# Patient Record
Sex: FEMALE | Race: BLACK OR AFRICAN AMERICAN | NOT HISPANIC OR LATINO | URBAN - METROPOLITAN AREA
[De-identification: names, ages, dates, MRNs, and addresses within clinical notes are randomized per-mention and may not be internally consistent; named-entity substitution may affect disease eponyms.]

---

## 2017-04-13 ENCOUNTER — OFFICE VISIT - HEALTHEAST (OUTPATIENT)
Dept: FAMILY MEDICINE | Facility: CLINIC | Age: 42
End: 2017-04-13

## 2017-04-13 DIAGNOSIS — G89.29 CHRONIC BILATERAL LOW BACK PAIN WITHOUT SCIATICA: ICD-10-CM

## 2017-04-13 DIAGNOSIS — M54.50 CHRONIC BILATERAL LOW BACK PAIN WITHOUT SCIATICA: ICD-10-CM

## 2017-04-13 DIAGNOSIS — E66.9 OBESITY: ICD-10-CM

## 2017-04-13 DIAGNOSIS — R53.83 FATIGUE: ICD-10-CM

## 2017-04-13 DIAGNOSIS — R42 VERTIGO: ICD-10-CM

## 2017-04-13 DIAGNOSIS — M25.561 ACUTE PAIN OF BOTH KNEES: ICD-10-CM

## 2017-04-13 DIAGNOSIS — M25.50 JOINT PAIN: ICD-10-CM

## 2017-04-13 DIAGNOSIS — M25.562 ACUTE PAIN OF BOTH KNEES: ICD-10-CM

## 2017-04-13 ASSESSMENT — MIFFLIN-ST. JEOR: SCORE: 1764.87

## 2017-04-17 ENCOUNTER — COMMUNICATION - HEALTHEAST (OUTPATIENT)
Dept: FAMILY MEDICINE | Facility: CLINIC | Age: 42
End: 2017-04-17

## 2017-05-12 ENCOUNTER — HOSPITAL ENCOUNTER (OUTPATIENT)
Dept: PHYSICAL MEDICINE AND REHAB | Facility: CLINIC | Age: 42
Discharge: HOME | End: 2017-05-12
Attending: NURSE PRACTITIONER

## 2017-05-12 DIAGNOSIS — R20.2 NUMBNESS AND TINGLING IN LEFT HAND: ICD-10-CM

## 2017-05-12 DIAGNOSIS — R20.0 NUMBNESS AND TINGLING IN LEFT HAND: ICD-10-CM

## 2017-05-12 DIAGNOSIS — M54.16 LEFT LUMBAR RADICULITIS: ICD-10-CM

## 2017-05-30 ENCOUNTER — AMBULATORY - HEALTHEAST (OUTPATIENT)
Dept: PHYSICAL MEDICINE AND REHAB | Facility: CLINIC | Age: 42
End: 2017-05-30

## 2017-06-12 ENCOUNTER — HOSPITAL ENCOUNTER (OUTPATIENT)
Dept: PHYSICAL MEDICINE AND REHAB | Facility: CLINIC | Age: 42
Discharge: HOME | End: 2017-06-12
Attending: NURSE PRACTITIONER

## 2017-06-12 DIAGNOSIS — M54.16 LEFT LUMBAR RADICULITIS: ICD-10-CM

## 2017-06-12 DIAGNOSIS — R20.2 NUMBNESS AND TINGLING IN LEFT HAND: ICD-10-CM

## 2017-06-12 DIAGNOSIS — M75.52 SHOULDER BURSITIS, LEFT: ICD-10-CM

## 2017-06-12 DIAGNOSIS — R20.0 NUMBNESS AND TINGLING IN LEFT HAND: ICD-10-CM

## 2017-07-06 ENCOUNTER — OFFICE VISIT - HEALTHEAST (OUTPATIENT)
Dept: PHYSICAL THERAPY | Facility: REHABILITATION | Age: 42
End: 2017-07-06

## 2017-07-06 DIAGNOSIS — M25.512 CHRONIC LEFT SHOULDER PAIN: ICD-10-CM

## 2017-07-06 DIAGNOSIS — G89.29 CHRONIC BILATERAL LOW BACK PAIN WITHOUT SCIATICA: ICD-10-CM

## 2017-07-06 DIAGNOSIS — G89.29 CHRONIC LEFT SHOULDER PAIN: ICD-10-CM

## 2017-07-06 DIAGNOSIS — M62.81 MUSCLE WEAKNESS (GENERALIZED): ICD-10-CM

## 2017-07-06 DIAGNOSIS — M54.50 CHRONIC BILATERAL LOW BACK PAIN WITHOUT SCIATICA: ICD-10-CM

## 2017-07-24 ENCOUNTER — HOSPITAL ENCOUNTER (OUTPATIENT)
Dept: PHYSICAL MEDICINE AND REHAB | Facility: CLINIC | Age: 42
Discharge: HOME | End: 2017-07-24
Attending: NURSE PRACTITIONER

## 2017-07-24 DIAGNOSIS — R20.0 NUMBNESS AND TINGLING IN LEFT HAND: ICD-10-CM

## 2017-07-24 DIAGNOSIS — M54.16 LEFT LUMBAR RADICULITIS: ICD-10-CM

## 2017-07-24 DIAGNOSIS — M75.52 SHOULDER BURSITIS, LEFT: ICD-10-CM

## 2017-07-24 DIAGNOSIS — M25.562 LEFT KNEE PAIN: ICD-10-CM

## 2017-07-24 DIAGNOSIS — R20.2 NUMBNESS AND TINGLING IN LEFT HAND: ICD-10-CM

## 2017-07-24 DIAGNOSIS — M22.2X2 PATELLOFEMORAL SYNDROME OF LEFT KNEE: ICD-10-CM

## 2017-07-25 ENCOUNTER — OFFICE VISIT - HEALTHEAST (OUTPATIENT)
Dept: PHYSICAL THERAPY | Facility: REHABILITATION | Age: 42
End: 2017-07-25

## 2017-07-25 DIAGNOSIS — M25.512 CHRONIC LEFT SHOULDER PAIN: ICD-10-CM

## 2017-07-25 DIAGNOSIS — G89.29 CHRONIC BILATERAL LOW BACK PAIN WITHOUT SCIATICA: ICD-10-CM

## 2017-07-25 DIAGNOSIS — G89.29 CHRONIC LEFT SHOULDER PAIN: ICD-10-CM

## 2017-07-25 DIAGNOSIS — M62.81 MUSCLE WEAKNESS (GENERALIZED): ICD-10-CM

## 2017-07-25 DIAGNOSIS — M54.50 CHRONIC BILATERAL LOW BACK PAIN WITHOUT SCIATICA: ICD-10-CM

## 2017-09-18 ENCOUNTER — OFFICE VISIT - HEALTHEAST (OUTPATIENT)
Dept: FAMILY MEDICINE | Facility: CLINIC | Age: 42
End: 2017-09-18

## 2017-09-18 DIAGNOSIS — R22.2 MASS ON BACK: ICD-10-CM

## 2017-09-18 ASSESSMENT — MIFFLIN-ST. JEOR: SCORE: 1823.84

## 2017-10-06 ENCOUNTER — HOSPITAL ENCOUNTER (OUTPATIENT)
Dept: ULTRASOUND IMAGING | Facility: CLINIC | Age: 42
Discharge: HOME | End: 2017-10-06
Attending: NURSE PRACTITIONER

## 2017-10-06 ENCOUNTER — COMMUNICATION - HEALTHEAST (OUTPATIENT)
Dept: FAMILY MEDICINE | Facility: CLINIC | Age: 42
End: 2017-10-06

## 2017-10-06 DIAGNOSIS — R22.2 MASS ON BACK: ICD-10-CM

## 2017-10-20 ENCOUNTER — COMMUNICATION - HEALTHEAST (OUTPATIENT)
Dept: FAMILY MEDICINE | Facility: CLINIC | Age: 42
End: 2017-10-20

## 2017-10-20 ENCOUNTER — HOSPITAL ENCOUNTER (OUTPATIENT)
Dept: MRI IMAGING | Facility: CLINIC | Age: 42
Discharge: HOME | End: 2017-10-20
Attending: NURSE PRACTITIONER

## 2017-10-20 DIAGNOSIS — R22.2 MASS ON BACK: ICD-10-CM

## 2017-10-20 DIAGNOSIS — D17.79 LIPOMA OF OTHER SPECIFIED SITES: ICD-10-CM

## 2017-11-06 ENCOUNTER — OFFICE VISIT - HEALTHEAST (OUTPATIENT)
Dept: SURGERY | Facility: CLINIC | Age: 42
End: 2017-11-06

## 2017-11-06 DIAGNOSIS — D17.1 LIPOMA OF BACK: ICD-10-CM

## 2017-11-06 ASSESSMENT — MIFFLIN-ST. JEOR: SCORE: 1833.37

## 2018-03-05 ENCOUNTER — COMMUNICATION - HEALTHEAST (OUTPATIENT)
Dept: PHYSICAL MEDICINE AND REHAB | Facility: CLINIC | Age: 43
End: 2018-03-05

## 2018-03-05 DIAGNOSIS — M54.16 LEFT LUMBAR RADICULITIS: ICD-10-CM

## 2018-05-17 ENCOUNTER — OFFICE VISIT - HEALTHEAST (OUTPATIENT)
Dept: SURGERY | Facility: CLINIC | Age: 43
End: 2018-05-17

## 2018-05-17 DIAGNOSIS — D17.1 LIPOMA OF TORSO: ICD-10-CM

## 2018-05-17 RX ORDER — FERROUS SULFATE 325(65) MG
325 TABLET, DELAYED RELEASE (ENTERIC COATED) ORAL
Status: SHIPPED | COMMUNITY
Start: 2017-11-17

## 2018-05-17 ASSESSMENT — MIFFLIN-ST. JEOR: SCORE: 1832.91

## 2018-05-22 ASSESSMENT — MIFFLIN-ST. JEOR: SCORE: 1810.23

## 2018-05-25 ENCOUNTER — ANESTHESIA - HEALTHEAST (OUTPATIENT)
Dept: SURGERY | Facility: AMBULATORY SURGERY CENTER | Age: 43
End: 2018-05-25

## 2018-05-29 ENCOUNTER — COMMUNICATION - HEALTHEAST (OUTPATIENT)
Dept: FAMILY MEDICINE | Facility: CLINIC | Age: 43
End: 2018-05-29

## 2018-05-29 ENCOUNTER — SURGERY - HEALTHEAST (OUTPATIENT)
Dept: SURGERY | Facility: AMBULATORY SURGERY CENTER | Age: 43
End: 2018-05-29

## 2018-05-29 ASSESSMENT — MIFFLIN-ST. JEOR: SCORE: 1810.23

## 2018-06-01 RX ORDER — LEVOTHYROXINE SODIUM 50 UG/1
TABLET ORAL
Qty: 90 TABLET | Refills: 0 | Status: SHIPPED | OUTPATIENT
Start: 2018-06-01

## 2018-06-13 ENCOUNTER — COMMUNICATION - HEALTHEAST (OUTPATIENT)
Dept: SURGERY | Facility: CLINIC | Age: 43
End: 2018-06-13

## 2018-10-12 ENCOUNTER — COMMUNICATION - HEALTHEAST (OUTPATIENT)
Dept: FAMILY MEDICINE | Facility: CLINIC | Age: 43
End: 2018-10-12

## 2018-10-15 ENCOUNTER — COMMUNICATION - HEALTHEAST (OUTPATIENT)
Dept: FAMILY MEDICINE | Facility: CLINIC | Age: 43
End: 2018-10-15

## 2021-05-30 VITALS — BODY MASS INDEX: 41.65 KG/M2 | WEIGHT: 250 LBS | HEIGHT: 65 IN

## 2021-05-31 VITALS — HEIGHT: 65 IN | BODY MASS INDEX: 43.82 KG/M2 | WEIGHT: 263 LBS

## 2021-05-31 VITALS — WEIGHT: 257 LBS | BODY MASS INDEX: 42.77 KG/M2

## 2021-05-31 VITALS — BODY MASS INDEX: 42.1 KG/M2 | WEIGHT: 253 LBS

## 2021-05-31 VITALS — BODY MASS INDEX: 44.17 KG/M2 | WEIGHT: 265.1 LBS | HEIGHT: 65 IN

## 2021-05-31 VITALS — BODY MASS INDEX: 41.27 KG/M2 | WEIGHT: 248 LBS

## 2021-06-01 VITALS — BODY MASS INDEX: 43.32 KG/M2 | HEIGHT: 65 IN | WEIGHT: 260 LBS

## 2021-06-01 VITALS — WEIGHT: 265 LBS | HEIGHT: 65 IN | BODY MASS INDEX: 44.15 KG/M2

## 2021-06-10 NOTE — PROGRESS NOTES
Assessment/Plan:       1. Chronic bilateral low back pain without sciatica  Patient's low back pain is quite bothersome for her with position changes.  I did discuss with patient different options which it is sounds as though she has done the conservative management in the past.  I will refer her to spine care for further evaluation of her low back pain.  I am suspicious that her low back pain is causing the pain in her knees secondary to a change in gait.  I discussed with patient symptomatic management in the meantime including ice, heat, and proper footwear.  I also discussed utilizing ibuprofen as needed and taking this with food.   - Ambulatory referral to [facility]    2. Acute pain of both knees  Etiology of the pain in bilateral knees were with the left worse than the right is unclear at this time.  Her complaint of bruising is not specifically concerning as her platelets are normal.  I am suspicious that the knee pain is secondary to low back pain.  She is obese and I discussed the impact of obesity on her generalized body aches and pain.  If her knee pain continues to be bothersome for her or worsens despite treatment of her low back I would like her to follow-up.  I would consider getting films of her knees as well as possibly an ultrasound if absolutely necessary.  Also discussed signs and symptoms of an acute DVT or infection.  She understands and is in agreement with this plan.  - HM2(CBC w/o Differential)  - Comprehensive Metabolic Panel    3. Vertigo  Patient appears to have vertigo of benign nature.  I discussed with the patient that it would be helpful for her to try Dr. Jordana Story is [facility] half somersault maneuver.  I guided her to view this video on YouTube.  Otherwise if symptoms continue to be bothersome I would have her see PT/OT for vestibular therapy.  She will follow-up if symptoms do not improve.    4. Fatigue  Patient's fatigue could be secondary to her chronic pain and joint pain.  I  "will get a CBC as well as a CMP.  CBC is within normal limits.  I will also check a thyroid function as well.  Patient will be treated as appropriate.  - HM2(CBC w/o Differential)  - Comprehensive Metabolic Panel    5. Joint pain  Etiology of joint pain is unclear.  CBC is within normal range.  A conference of metabolic panel will be completed as well.  I deferred a sed rate as well as a CRP at.  - HM2(CBC w/o Differential)  - Comprehensive Metabolic Panel    6. Obesity  Patient has significant obesity with a BMI of 41.6.  I discussed with patient that it would be beneficial for her to focus on weight loss.  I also discussed the patient that obesity can worsen the symptoms that she is experiencing and I am concerned that some of the symptoms she is having is secondary to her obesity.  I checked a CBC which was within normal limits.  A thyroid cascade as well as a conference of metabolic panel will be also completed.  She was encouraged to follow-up as needed.  - HM2(CBC w/o Differential)  - Thyroid Stimulating Hormone (TSH)  - Comprehensive Metabolic Panel        Subjective:      Marissa Dixon is a 42 y.o. female who presents for left leg pain. This started about 1 month ago without known injury. The pain is worse at night. It becomes heavy and difficult to lift. The pain is worse when she bends the knee. It does occur throughout the day as well. The pain is described as throbbing sensation globally encompansing the knee. When the pain is present it will then cause the whole leg to hurt. She will take ibuprofen to help with the pain, but has not found this helpful. She has run warm water on the whole leg and this helps some. She reports that she has frequent bruising as well but this has been present for longer than the knee pain.   She also has a history of low back pain. She has been worked up for this but it was about a year ago. She reports that \"there is nothing found, and was nothing is wrong\".  She denies any " sciatica with her low back pain.  She reports that her back pain hurts her all the time.  She also has generalized body aches and pains along with fatigue. The fatigue seems to come and go and is occasionally associated with the body aches. She also mentions that when she changes position she gets dizziness.  She reports that it feels as though the room is spinning and not necessarily that she is spinning.  She notices it most often when she is laying down at night however she also notices it occasionally when she turns her head from left to right.  She does not know if one side is worse than the other.  And finally she brings up a lump in her right upper back that has been recently identified.  She is not sure how long it has been there and she does not recall an injury to the upper back.  She feels as though it is swollen.  This does not cause pain.  She also denies any recent changes to this as well.      She has had 11 pregnancies. Her oldest is 24 and her youngest is 19 months. Pain worsens in the knee when she is laying flat.     The following portions of the patient's history were reviewed and updated as appropriate: allergies, current medications, past family history, past medical history, past social history, past surgical history and problem list.    History reviewed. No pertinent past medical history.  Past Surgical History:   Procedure Laterality Date      SECTION, CLASSIC       Family History   Problem Relation Age of Onset     Cancer Neg Hx      Diabetes Neg Hx      Heart disease Neg Hx      Hyperlipidemia Neg Hx      Hypertension Neg Hx      Stroke Neg Hx      Social History     Social History     Marital status:      Spouse name: N/A     Number of children: N/A     Years of education: N/A     Occupational History     Not on file.     Social History Main Topics     Smoking status: Never Smoker     Smokeless tobacco: Not on file     Alcohol use Not on file     Drug use: Not on file      "Sexual activity: Yes     Partners: Male     Other Topics Concern     Not on file     Social History Narrative     No narrative on file     Review of Systems   A 12 point comprehensive review of systems was negative except as noted.      Objective:      /70 (Patient Site: Left Arm, Patient Position: Sitting, Cuff Size: Adult Large)  Pulse 68  Resp 16  Ht 5' 5\" (1.651 m)  Wt (!) 250 lb (113.4 kg)  BMI 41.6 kg/m2    General appearance: alert, appears stated age, cooperative and moderately obese  Head: Normocephalic, without obvious abnormality, atraumatic  Eyes: conjunctivae/corneas clear. PERRL, EOM's intact. Fundi benign.  Ears: normal TM's and external ear canals both ears  Neck: no adenopathy, no carotid bruit, no JVD, supple, symmetrical, trachea midline and thyroid not enlarged, symmetric, no tenderness/mass/nodules  Back: symmetric, no curvature. ROM normal. No CVA tenderness., right upper back over shoulder blade, appears to have a subcutaneous cyst. Mildl fluctuant. Non painful to plapation.   Lungs: clear to auscultation bilaterally examination of patient's lumbar spine is significant for decreased range of motion with flexion, extension, and rotation.  Pain worsens when patient is laying down.  Patient unable to lay flat on her back without bending in the first.  It is worse on the right compared to the left.  Heart: regular rate and rhythm, S1, S2 normal, no murmur, click, rub or gallop  Extremities: extremities normal, atraumatic, no cyanosis or edema and Anterior drawer and posterior drawer of the left knee is negative today.  Patient does report that she can feel the sensation with varus and valgus testing however it is not severe.  No increase in pain when patient is supine versus sitting except for increased low back pain.  Pulses: 2+ and symmetric  Skin: Skin color, texture, turgor normal. No rashes or lesions  Neurologic: Grossly normal     Results for orders placed or performed in visit " on 04/13/17   HM2(CBC w/o Differential)   Result Value Ref Range    WBC 4.2 4.0 - 11.0 thou/uL    RBC 4.40 3.80 - 5.40 mill/uL    Hemoglobin 11.9 (L) 12.0 - 16.0 g/dL    Hematocrit 36.3 35.0 - 47.0 %    MCV 82 80 - 100 fL    MCH 27.1 27.0 - 34.0 pg    MCHC 32.9 32.0 - 36.0 g/dL    RDW 13.0 11.0 - 14.5 %    Platelets 325 140 - 440 thou/uL    MPV 7.7 7.0 - 10.0 fL

## 2021-06-10 NOTE — PROGRESS NOTES
New patient evaluation  --Referred by Ashley Martin  --Patient reports of ongoing low back pain since  in , comes and goes  --Today C/O mid low back pain with left entire leg pain x 2 months  --No known injury  --Rates back pain 6/10  --At bedtime her left leg pain will get worse, 9/10 and will get numb/tingling per pt  --Edema in both feet per pt  --Also C/O left arm pain with tingling at bedtime, ongoing  --No left arm pain now per pt  --No hx of back surgery, injection, PT  --Patient reports of a lumbar xray a long time ago, unsure of where it was done    Medication  --Ibuprofen PRN OTC

## 2021-06-10 NOTE — PROGRESS NOTES
Called pt with an  and she will  medication and start it and will come back in 4 wks to see Mahad Méndez, NIKOLAS

## 2021-06-10 NOTE — PROGRESS NOTES
ASSESSMENT: Marissa Dixon is a 42 y.o. female who presents for consultation at the request of [facility] PCP Ashley Martin CNP, with a past medical history significant for anemia, thyroid disease who presents today for new patient evaluation of ongoing chronic low back pain since  after having a  however new left lumbar radiculitis in a nonspecific pattern ongoing for 2 months but does become severe at nighttime with associated numbness and tingling.    Patient is neurologically intact on exam.    She does endorse numbness and tingling in left hand that comes and goes as well specifically worse at nighttime.    Patient does endorse fatigue as well has a recent diagnosis of hypothyroidism 2017 and was started on Synthroid.    TATIANA: 20%    WHO-5: 18    Diagnoses and all orders for this visit:    Left lumbar radiculitis  -     Ambulatory referral to PT/OT  -     gabapentin (NEURONTIN) 100 MG capsule; Take 300 mg by mouth 3 (three) times a day. Follow Gabapentin Dosing chart given  Dispense: 270 capsule; Refill: 3    Numbness and tingling in left hand  -     Ambulatory referral to PT/OT  -     gabapentin (NEURONTIN) 100 MG capsule; Take 300 mg by mouth 3 (three) times a day. Follow Gabapentin Dosing chart given  Dispense: 270 capsule; Refill: 3      PLAN:  Reviewed spine anatomy and disease process. Discussed diagnosis and treatment options with the patient today. A shared decision making model was used.  The patient's values and choices were respected. The following represents what was discussed and decided upon by the provider and the patient.      -DIAGNOSTIC TESTS: No imaging at this time, can consider lumbar spine MRI if she is not getting further relief with physical therapy due to radiculitis however she is neurologically intact on exam therefore can hold off on this.  --Can also consider right upper extremity EMG if numbness and tingling and arm pain worsens.    -PHYSICAL THERAPY: Referral to  physical therapy [facility] sent today for nerve glide exercises for left lumbar radiculopathy, core strengthening. discussed the importance of core strengthening, ROM, stretching exercises with the patient and how each of these entities is important in decreasing pain.  Explained to the patient that the purpose of physical therapy is to teach the patient a home exercise program.  These exercises need to be performed every day in order to decrease pain and prevent future occurrences of pain.        -MEDICATIONS: Did prescribe gabapentin 100 mg to titrate up to 3 tablets 3 times a day as tolerated for radicular pain.  Dosing chart given.  -Also advised patient to continue ibuprofen as needed which is giving her some benefit.  Discussed side effects of medications and proper use. Patient verbalized understanding.    -PATIENT EDUCATION:  45 minutes of total visit time was spent face to face with the patient today, 60% of the visit was spent on counseling, education, and coordinating care.     -FOLLOW-UP:   Follow-up in 4 weeks time, sooner pain is worsening or new symptoms arise.    Advised pt to call the [facility] if symptoms worsen or you have problems controlling bladder and bowel function.   ______________________________________________________________________    SUBJECTIVE:  HPI:  Marissa Dixon  Is a 42 y.o. female who presents today for new patient evaluation of low back pain across the lumbosacral junction that is been ongoing since 2015 after having a  however does come and go.  New pain in the last 2 months with no known injury left leg pain and numbness and tingling entire leg nonspecific pattern that is worse into the left knee that is more bothersome with lying down at nighttime as well as sitting for long, walking typically is okay.  Currently her pain is a 6/10 but it does get to a 9/10 at nighttime again with associated numbness and tingling.  Patient denies weakness, recent trips  or falls, denies bowel or bladder dysfunction.    She does also endorse chronic numbness and tingling in her entire left arm with slight arm pain that is currently a 2/10 that comes and goes.    Treatment to Date: No prior physical therapy, spinal injections, spinal surgery.    Medications: Ibuprofen occasionally with some relief.    Current Outpatient Prescriptions on File Prior to Encounter   Medication Sig Dispense Refill     levothyroxine (SYNTHROID) 50 MCG tablet Take 1 tablet (50 mcg total) by mouth daily. 90 tablet 3     No current facility-administered medications on file prior to encounter.        No Known Allergies    Past Medical History:   Diagnosis Date     Anemia      Disease of thyroid gland      Painful swelling of joint         There is no problem list on file for this patient.      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  Patient denies smoking, alcohol, illicit drug use.    ROS: Positive for swelling in feet, back pain, joint pain, leg pain.  Specifically negative for bowel/bladder dysfunction, balance changes, headache, dizziness, foot drop, fevers, chills, appetite changes, nausea/vomiting, unexplained weight loss. Otherwise 13 systems reviewed are negative. Please see the patient's intake questionnaire from today for details.    OBJECTIVE:  /60 (Patient Site: Left Arm, Patient Position: Sitting)  Pulse 75  Temp 98.2  F (36.8  C) (Oral)   Wt (!) 248 lb (112.5 kg)  SpO2 97%  BMI 41.27 kg/m2    PHYSICAL EXAMINATION:    --CONSTITUTIONAL:  Vital signs as above.  No acute distress.  The patient is well nourished and well groomed.  --PSYCHIATRIC:  Appropriate mood and  affect. The patient is awake, alert, oriented to person, place, time and answering questions appropriately with clear speech.    --SKIN:  Skin over the face, bilateral lower extremities, and posterior torso is clean, dry, intact without rashes.    --RESPIRATORY: Normal rhythm and effort. No abnormal accessory muscle breathing patterns noted.   --ABDOMINAL:  Soft, non-distended, non-tender throughout all quadrants.  No pulsatile mass palpated in the left lower quadrant.  --STANDING EXAMINATION:  Normal lumbar lordosis noted, no lateral shift.  --MUSCULOSKELETAL: Lumbar spine inspection reveals no evidence of deformity. Range of motion is not limited in lumbar flexion, extension.  Mild tenderness to palpation lumbar spine. Straight leg raising in the supine position is negative to radicular pain. Sciatic notch non-tender.  --GROSS MOTOR: Gait is non-antalgic. Easily arises from a seated position. Toe walking and heel walking are normal without significant difficulty.    --LOWER EXTREMITY MOTOR TESTING:  Plantar flexion left 5/5, right 5/5   Dorsiflexion left 5/5, right 5/5   Great toe MTP extension left 5/5, right 5/5  Knee flexion left 5/5, right 5/5  Knee extension left 5/5, right 5/5   Hip flexion left 5/5, right 5/5  Hip abduction left 5/5, right 5/5  Hip adduction left 5/5, right 5/5   --HIPS: Full range of motion bilaterally. Negative FABERs on the involved lower extremity.   --NEUROLOGICAL:  2/4 patellar, medial hamstring, and achilles reflexes bilaterally.  Sensation to light touch is intact in the bilateral L4, L5, and S1 dermatomes. Babinski is negative. No clonus.  --VASCULAR:  2/4 dorsalis pedis and posterior tibialsi pulses bilaterally.  Bilateral lower extremities are warm.  There is no pitting edema of the bilateral lower extremities.    RESULTS: Prior medical records from 4/13/2017 were reviewed today.    Imaging: No results found.

## 2021-06-11 NOTE — PROGRESS NOTES
[facility]   [place]-Pelvic Initial Evaluation    Patient Name: Marissa Dixon  Date of evaluation: 7/6/2017  Referral Diagnosis: Left lumbar radiculitis, numbness and tingling in L hand, shoulder bursitis L  Referring provider: Deborah Mae C*  Visit Diagnosis:     ICD-10-CM    1. Chronic bilateral low back pain without sciatica M54.5     G89.29    2. Chronic left shoulder pain M25.512     G89.29    3. Muscle weakness (generalized) M62.81        Assessment:        Marissa Dixon is a 42 y.o. female who presents to therapy today with chief complaints of low back pain and L shoulder pain. Onset date of sx was 3 years ago for the low back and 1 year ago for the L shoulder.  Pt reported h/o R shoulder pain that improved with a cortisone injection, otherwise not significant.  Pain symptoms are sharp and poking in nature.  Functional impairments include heavy lifting, dressing, and reaching up.  Pt demo's signs and sx consistent with L shoulder pain with inflammation and chronic low back pain without sciatica.   Pt. is appropriate for skilled PT intervention as outlined in the Plan of Care (POC).  Pt. is a good candidate for skilled PT services to improve pain levels and function.    Goals:  Pt. will be independent with home exercise program in : 6 weeks  Pt will: be able to sleep through the night without waking d/t pain; in 6 weeks  Pt will: be able to reach up into a cupboard without pain; in 6 weeks  Pt will: be able to perform housework without pain; in 6 weeks    Patient's expectations/goals are realistic.    Barriers to Learning or Achieving Goals:  Language barriers.       Plan / Patient Instructions:        Plan of Care:   Communication with: Referral Source  Patient Related Instruction: Nature of Condition;Treatment plan and rationale;Self Care instruction;Basis of treatment;Body mechanics;Posture  Times per Week: 1  Number of Weeks: 12  Number of Visits: 12  Precautions / Restrictions :  None  Therapeutic Exercise: ROM;Stretching;Strengthening  Neuromuscular Reeducation: kinesio tape;posture;balance/proprioception;core  Manual Therapy: soft tissue mobilization;myofascial release;joint mobilization;muscle energy;strain counterstrain  Modalities: electrical stimulation;ultrasound    POC and pathology of condition were reviewed with patient.  Pt. is in agreement with the Plan of Care  A Home Exercise Program (HEP) was initiated today.  Pt. was instructed in exercises by PT and patient was given a handout with detailed instructions.    Plan for next visit: Core strengthening, shoulder strengthening, recheck pelvic landmarks.     Subjective:       The patient reports that she has pain in her L knee and her L leg is swollen.  Her back has been bothering her for almost 3 years with recent increase in her pain.  Her L shoulder started bothering her at the end of .  This pain has recently worsened as well.      Social information:   Living Situation:single family home   Occupation:homemaker   Work Status:NA   Equipment Available: None    Pain Ratin  Pain rating at best: 2  Pain rating at worst: 10  Pain description: stabbing pain in the back, and poking pain in her shoulder    Functional limitations are described as occurring with:   Heavy lifting, dressing, reaching up    Patient reports benefit from:  anti-inflammatory       Objective:      Note: Items left blank indicates the item was not performed or not indicated at the time of the evaluation.    Examination  1. Chronic bilateral low back pain without sciatica     2. Chronic left shoulder pain     3. Muscle weakness (generalized)       Involved side: Left shoulder, bilateral low back  Posture Observation:      General sitting posture is  fair.  General standing posture is fair.  Cervical:  Mild forward head  Lumbopelvic complex: Moderately increased lumbar lordosis    Lumbar ROM:    Date: 17     *Indicate scale AROM AROM AROM   Lumbar  Flexion WNL     Lumbar Extension Mild pain      Right Left Right Left Right Left   Lumbar Sidebending WNL WNL       Lumbar Rotation WNL WNL       Thoracic Flexion      Thoracic Extension      Thoracic Sidebending         Thoracic Rotation           Lower Extremity Strength:     Date: 07/06/17     LE strength/5 Right Left Right Left Right Left   Hip Flexion (L1-3) 5 Pain       Hip Extension (L5-S1)         Hip Abduction (L4-5)         Hip Adduction (L2-3)         Hip External Rotation         Hip Internal Rotation         Knee Extension (L3-4) 5 5       Knee Flexion 5 4       Ankle Dorsiflexion (L4-5) 5        Great Toe Extension (L5)         Ankle Plantar flexion (S1)         Abdominals        Sensation            Reflex Testing  Lumbar Dermatomes Right Left UE Reflexes Right Left   Iliac Crest and Groin (L1)   Biceps (C5-6)     Anterior Medial Thigh (L2)   Brachioradialis (C5-6)     Anterior Thigh, Medial Epicondyle Femur (L3)   Triceps (C7-8)     Lateral Thigh, Anterior Knee, Medial Leg/Malleolus (L4)   Kenny s test     Lateral Leg, Dorsal Foot (L5)   LE Reflexes     Lateral Foot (S1)   Patellar (L3-4)     Posterior Leg (S2)   Achilles (S1-2)     Other:   Babinski Response       Palpation: Tenderness low lumbar musculature, pubic symphysis on the R side, and subacromial space/bicep insertion in the L shoulder.    Lumbar Special Tests:     Lumbar Special Tests Right Left SI Tests Right  Left   Quadrant test   SI Compression     Straight leg raise   SI Distraction     Crossover response   POSH Test     Slump   Sacral Thrust     Sit-up test  FADIR     Trunk extensor endurance test  Resisted Abduction     Prone instability test  Other:     Pubic shotgun  Other:       Repeated Motion Testing:  Not indicated    Passive Mobility - Joint Integrity:  Hypomobility and pain lumbar vertebrae centrally and bilateral facets.    LE Screen:  WNL    Shoulder/Elbow ROM  Date: 7/6/17     Shoulder and Elbow ROM ( )   AROM in  degrees AROM in degrees AROM in degrees    Right Left Right Left Right Left   Shoulder Flexion (0-180 ) WFL WFL       Shoulder Abduction (0-180 ) WFL WFL       Shoulder Extension (0-60 ) WFL WFL       Shoulder ER (0-90 ) WFL WFL       Shoulder IR (0-70 ) WFL WFL       Shoulder IR/EXT WFL WFL       Elbow Flexion (150 ) WFL WFL       Elbow Extension (0 ) WFL WFL        PROM in degrees PROM in degrees PROM in degrees    Right Left Right Left Right Left   Shoulder Flexion (0-180 )         Shoulder Abduction (0-180 )         Shoulder Extension (0-60 )         Shoulder ER (0-90 )         Shoulder IR (0-70 )         Elbow Flexion (150 )         Elbow Extension (0 )           Shoulder/Elbow Strength  Date: 7/6/17     Shoulder/Elbow Strength (/5)  Manual Muscle Test (MMT) MMT MMT MMT    Right Left Right Left Right Left   Shoulder Flexion 5 Pain       Supraspinatus         Shoulder Abduction 5 Pain       Shoulder Extension         Shoulder External Rotation 5 Pain       Shoulder Internal Rotation 5 5       Elbow Flexion 5 5       Elbow Extension 5 5       Other:         Other:           Shoulder Special Tests  Impingement Cluster Right (+/-) Left (+/-) Rotator Cuff Tests Right (+/-) Left (+/-)   Duenas-Jatin - - Drop Arm Sign     Painful Arc - - Hornblowers     Infraspinatus Test - - ERLS     AC Tests Right (+/-) Left (+/-) IRLS     Active Compression   Labral Tests Right (+/-) Left (+/-)   Crossbody Adduction   Biceps Load Test II     AC Resisted Extension   Jerk Test     GH Instability Tests Right (+/-) Left (+/-) Nidia Test     Sulcus Sign   Biceps Tests Right (+/-) Left (+/-)   Apprehension   Speed     Relocation   Marcy yanez     Other:   Other:     Other:   Other:       Appt time: 2:45PM - 3:30PM    Treatment Today     TREATMENT MINUTES COMMENTS   Evaluation 35 Low complexity lumbar and shoulder evaluation   Self-care/ Home management     Manual therapy     Neuromuscular Re-education     Therapeutic Activity      Therapeutic Exercises 10 Demo/performance of HEP  Patient educated on pathology  Discussed POC  Patient educated on icing her shoulder as well as changing her sleeping position so her arm doesn't get numb/tingly   Gait training     Modality__________________                Total 45    Blank areas are intentional and mean the treatment did not include these items.     PT Evaluation Code: (Please list factors)  Patient History/Comorbidities: None  Examination: lumbar spine hypomobile with pain, shoulder pain with mm activation  Clinical Presentation: Stable  Clinical Decision Making: Low complexity    Patient History/  Comorbidities Examination  (body structures and functions, activity limitations, and/or participation restrictions) Clinical Presentation Clinical Decision Making (Complexity)   No documented Comorbidities or personal factors 1-2 Elements Stable and/or uncomplicated Low   1-2 documented comorbidities or personal factor 3 Elements Evolving clinical presentation with changing characteristics Moderate   3-4 documented comorbidities or personal factors 4 or more Unstable and unpredictable High            Selene Donnelly, PT, DPT  7/6/2017  4:01 PM

## 2021-06-11 NOTE — PROGRESS NOTES
Assessment:   Diagnoses and all orders for this visit:    Left lumbar radiculitis  -     gabapentin (NEURONTIN) 300 MG capsule; Take 3 capsules (900 mg total) by mouth 3 (three) times a day. Follow Gabapentin Dosing chart given  Dispense: 270 capsule; Refill: 3  -     Cancel: Ambulatory referral to PT/OT  -     Ambulatory referral to PT/OT    Numbness and tingling in left hand  -     Cancel: Ambulatory referral to PT/OT  -     Ambulatory referral to PT/OT    Shoulder bursitis, left  -     Cancel: Ambulatory referral to PT/OT  -     Ambulatory referral to PT/OT          Marissa RUPINDER Dixon is a 42 y.o. y.o. female with past medical history significant for anemia, thyroid disease who presents today for follow-up regarding ongoing chronic low back pain since 2015 with left lumbar radiculitis in an L5 pattern ongoing that is worse at nighttime with associated numbness and tingling, minimal relief with gabapentin however she has not started physical therapy due to a time of fasting in her [place].    Does also endorse numbness and tingling left hand that comes and goes as well that is worse at nighttime.      Left lateral deltoid/shoulder pain consistent with bursitis.    Patient is neurologically intact on exam     Plan:     A shared decision making plan was used. The patient's values and choices were respected. Prior medical records from 5/12/17 were reviewed today. The following represents what was discussed and decided upon by the provider and the patient.        -DIAGNOSTIC TESTS: No imaging at this time, can consider lumbar spine MRI if she is not getting further relief with physical therapy due to radiculitis however she is neurologically intact on exam therefore can hold off on this.  --Can also consider right upper extremity EMG if numbness and tingling and arm pain worsens.  [facility]    -MEDICATIONS: Did increase gabapentin to 300 mg to titrate up to 3 tablets 3 times  a day as tolerated for radicular pain.  She is tolerating 100 mg with minimal relief, no side effect.  Discussed side effects of medications and proper use. Patient verbalized understanding.    -PHYSICAL THERAPY: Did highly recommend physical therapy however she is fasting for another 2 weeks, advised her to schedule appointment starting a day or 2 after her fasting ends for chronic low back pain, right lumbar radiculopathy and left shoulder bursitis.  Discussed the importance of core strengthening, ROM, stretching exercises with the patient and how each of these entities is important in decreasing pain.  Explained to the patient that the purpose of physical therapy is to teach the patient a home exercise program.  These exercises need to be performed every day in order to decrease pain and prevent future occurrences of pain.        -PATIENT EDUCATION:  20 minutes of total visit time was spent face to face with the patient today, 60 % of the visit was spent on counseling, education, and coordinating care.     -FOLLOW UP: Follow-up in 6 weeks, sooner if pain worsens or new symptoms arise, and follow-up at anytime if weakness occurs.  Advised to contact clinic if symptoms worsen or change.    Subjective:     Marissa Dixon is a 42 y.o. female who presents today for follow-up regarding chronic low back pain across lumbosacral junction ongoing since  after having a  that comes and goes.  Ongoing since the beginning of May is new pain into the left lateral thigh lateral knee lateral shin that is currently an 8/10, improves with walking and is more significant in the evening hours and with lying down.  She does report associated numbness and tingling as well.  Patient denies weakness, recent trips or falls, denies bowel or bladder dysfunction.     She does also endorse chronic numbness and tingling in her entire left arm with slight arm pain that is currently a 2/10 that comes and goes.  She does also endorse  pain into the left deltoid region/shoulder.  She reports history of bursitis on the right shoulder and had a steroid injection in the past with relief.     Treatment to Date: No prior physical therapy, spinal injections, spinal surgery.     Medications:   Ibuprofen occasionally with some relief.  [place] 100 mg 3-3-3 with minimal relief, no side effect.    Current Outpatient Prescriptions on File Prior to Encounter   Medication Sig Dispense Refill     gabapentin (NEURONTIN) 100 MG capsule Take 300 mg by mouth 3 (three) times a day. Follow Gabapentin Dosing chart given 270 capsule 3     levothyroxine (SYNTHROID) 50 MCG tablet Take 1 tablet (50 mcg total) by mouth daily. 90 tablet 3     No current facility-administered medications on file prior to encounter.        No Known Allergies    Past Medical History:   Diagnosis Date     Anemia      Disease of thyroid gland      Painful swelling of joint         Review of Systems  ROS:  Specifically negative for bowel/bladder dysfunction, balance changes, headache, dizziness, foot drop, fevers, chills, appetite changes, nausea/vomiting, unexplained weight loss. Otherwise 13 systems reviewed are negative. Please see the patient's intake questionnaire from today for details.    Reviewed Social, Family, Past Medical and Past Surgical history with patient, no significant changes noted since prior visit.     Objective:     /59 (Patient Site: Left Arm, Patient Position: Sitting)  Pulse 85  Wt (!) 253 lb (114.8 kg)  BMI 42.1 kg/m2    PHYSICAL EXAMINATION:    --CONSTITUTIONAL: Well developed, well nourished, healthy appearing individual.  --PSYCHIATRIC: Appropriate mood and affect. No difficulty interacting due to temper, social withdrawal, or memory issues.  --SKIN: Lumbar region is dry and intact. Sensation to light touch is intact in the bilateral L4, L5, and S1 dermatomes.  --RESPIRATORY: Normal rhythm and effort. No abnormal accessory muscle breathing patterns noted.    --MUSCULOSKELETAL:  Normal lumbar lordosis noted, no lateral shift.  --GROSS MOTOR: Easily arises from a seated position.   --LUMBAR SPINE:  Inspection reveals no evidence of deformity. Range of motion is not limited in lumbar flexion, extension, or lateral rotation. Mild tenderness to palpation lumbar spine. Straight leg raising in the supine position is negative to radicular pain. Sciatic notch non-tender.   --LOWER EXTREMITY MOTOR TESTING:  Plantar flexion left 5/5, right 5/5   Dorsiflexion left 5/5, right 5/5   Great toe MTP extension left 5/5, right 5/5  Knee flexion left 5/5, right 5/5  Knee extension left 5/5, right 5/5   Hip flexion left 5/5, right 5/5  Hip abduction left 5/5, right 5/5  Hip adduction left 5/5, right 5/5   --HIPS: Full range of motion bilaterally. Negative FABERs on the involved lower extremity.   --NEUROLOGIC: Bilateral patellar and achilles reflexes are physiologic and symmetric. Lower extremities are intact to light touch.     RESULTS:   Imaging:     Left knee x-ray   5/30/2017  Findings:  There is no fracture, lytic or blastic process, significant degenerative change, dislocation, subluxation or malalignment.  There is no significant soft tissue abnormality.  There is no radiopaque foreign body.

## 2021-06-11 NOTE — PROGRESS NOTES
F/U from 5/12/17  --Patient brought in xray report, left knee with [facility]. SEBASTIAN signed for CD notes.    --C/O left anterior knee pain with left lateral lower leg pain, worsening  --Swelling in left ankle  --Rates knee pain 8/10  --Low back pain at bedtime, doing better though  --No PT appt yet due to fasting June 2017 due to culture per pt. Per pt, she will call back to schedule PT when she is ready.     Medication  --Gabapentin 100 mg 3 caps TID but now decreased to 3 caps QHS due to fasting this month per pt

## 2021-06-12 NOTE — PROGRESS NOTES
[facility] Daily Progress     Patient Name: Marissa Dixon  Date: 2017  Visit #: 2  PTA visit #:    Referral Diagnosis: Left lumbar radiculitis  Referring provider: Deborah Mae C*  Visit Diagnosis:     ICD-10-CM    1. Chronic bilateral low back pain without sciatica M54.5     G89.29    2. Chronic left shoulder pain M25.512     G89.29    3. Muscle weakness (generalized) M62.81      Marissa Dixon is a 42 y.o. female who presents to therapy today with chief complaints of low back pain and L shoulder pain. Onset date of sx was 3 years ago for the low back and 1 year ago for the L shoulder.  Pt reported h/o R shoulder pain that improved with a cortisone injection, otherwise not significant.  Pain symptoms are sharp and poking in nature.  Functional impairments include heavy lifting, dressing, and reaching up.  Pt demo's signs and sx consistent with L shoulder pain with inflammation and chronic low back pain without sciatica.     Precautions / Restrictions : None    Assessment:     HEP/POC compliance is  good .  The patient presents to PT today 15 minutes late for her appointment after cancelling and NS her last 2 appointments.  She reports improved LBP, but continued shoulder pain.  She has a good understanding of her HEP and is appropriate to continue with PT services at this time.    Goal Status:  Pt. will be independent with home exercise program in : 6 weeks  Pt will: be able to sleep through the night without waking d/t pain; in 6 weeks  Pt will: be able to reach up into a cupboard without pain; in 6 weeks  Pt will: be able to perform housework without pain; in 6 weeks    Plan / Patient Education:     Continue with initial plan of care.  Progress with home program as tolerated.  Recheck pelvic landmarks.  Progress strengthening.    Subjective:     Pain Ratin  The patient reports no change since her last appointment.  She saw her MD and they found nothing wrong in her leg.  The shoulder  is painful, but the back is better.    Objective:     Swelling L LE.    Appt time: 3:45PM - 4:00PM    Treatment Today     TREATMENT MINUTES COMMENTS   Evaluation     Self-care/ Home management     Manual therapy     Neuromuscular Re-education     Therapeutic Activity     Therapeutic Exercises 15 See flowsheet   Gait training     Modality__________________                Total 15    Blank areas are intentional and mean the treatment did not include these items.       Selene Donnelly, PT, DPT  7/25/2017

## 2021-06-12 NOTE — PROGRESS NOTES
Assessment:     Diagnoses and all orders for this visit:    Left lumbar radiculitis    Numbness and tingling in left hand    Shoulder bursitis, left    Left knee pain  -     Ambulatory referral to PT/OT  -     nabumetone (RELAFEN) 500 MG tablet; Take 1 tablet (500 mg total) by mouth 3 (three) times a day as needed for pain.  Dispense: 42 tablet; Refill: 1    Patellofemoral syndrome of left knee  -     Ambulatory referral to PT/OT       Marissa RUPINDER Dixon is a 42 y.o. y.o. female with past medical history significant for anemia, thyroid disease who presents today for follow-up regarding chronic low back pain that is been tolerable lately, and patient complains of more bothersome left knee pain worse with sitting consistent with patellofemoral syndrome.    Patient is neurologically intact on exam.  Negative anterior/posterior drawer.     Plan:     A shared decision making plan was used. The patient's values and choices were respected. Prior medical records from 6/12/17 were reviewed today. The following represents what was discussed and decided upon by the provider and the patient.        -DIAGNOSTIC TESTS: Images were personally reviewed and interpreted.  No need for further imaging at this time.  --Previous left knee x-ray from [facility] was unremarkable.    -MEDICATIONS: Advised patient to wean off of gabapentin as tolerated as it is not giving her benefit.  -Trial nabumetone 500 mg 1 tablet 3 times daily as needed for pain.  Advised to take this medication with food and water.Reviewed risks of NSAIDs, including GI irritation, kidney dysfunction, and CV effects.  Discussed side effects of medications and proper use. Patient verbalized understanding.    -PHYSICAL THERAPY: Did add on physical therapy stretches and exercises to current PT program to focus on left knee pain/femoral patellar syndrome.  Discussed the importance of core strengthening, ROM, stretching exercises with the patient and how each of these  entities is important in decreasing pain.  Explained to the patient that the purpose of physical therapy is to teach the patient a home exercise program.  These exercises need to be performed every day in order to decrease pain and prevent future occurrences of pain.        -PATIENT EDUCATION:  15 minutes of total visit time was spent face to face with the patient today, 60 % of the visit was spent on counseling, education, and coordinating care.   -5 minutes spent outside of visit time, non-face-to-face time, reviewing chart.    -FOLLOW UP: Follow-up in 6 weeks, sooner pain is worsening or new symptoms arise.  Advised to contact clinic if symptoms worsen or change.    Subjective:     Marissa Dixon is a 42 y.o. female who presents today for follow-up regarding ongoing chronic low back pain across the lumbosacral junction ongoing since 2015 that comes and goes however is tolerable at this time.  Her more concerning symptoms today is her left knee pain anterior and lateral that is currently a 4/10 up to an 8/10 at its worst more bothersome with sitting as well as stairs and at nighttime, improved with walking.  She denies weakness.  She does endorse numbness and tingling occasionally that comes and goes into the second and third toes on the left.    She does also endorse chronic numbness and tingling in her entire left arm with slight arm pain that is currently a 2/10 that comes and goes.  She does also endorse pain into the left deltoid region/shoulder.  She reports history of bursitis on the right shoulder and had a steroid injection in the past with relief.      Treatment to Date: No prior physical therapy, spinal injections, spinal surgery.      Medications:   Ibuprofen occasionally with some relief.  Gabapentin 100 mg 3-3-3 with minimal relief, no side effect.    Current Outpatient Prescriptions on File Prior to Encounter   Medication Sig Dispense Refill     gabapentin (NEURONTIN) 300 MG capsule Take 3 capsules  (900 mg total) by mouth 3 (three) times a day. Follow Gabapentin Dosing chart given 270 capsule 3     levothyroxine (SYNTHROID) 50 MCG tablet Take 1 tablet (50 mcg total) by mouth daily. 90 tablet 3     No current facility-administered medications on file prior to encounter.        No Known Allergies    Past Medical History:   Diagnosis Date     Anemia      Disease of thyroid gland      Painful swelling of joint         Review of Systems  ROS: Positive for numbness and tingling, headache, dizziness.  Specifically negative for bowel/bladder dysfunction, balance changes, foot drop, fevers, chills, appetite changes, nausea/vomiting, unexplained weight loss. Otherwise 13 systems reviewed are negative. Please see the patient's intake questionnaire from today for details.    Reviewed Social, Family, Past Medical and Past Surgical history with patient, no significant changes noted since prior visit.     Objective:     /58 (Patient Site: Left Arm, Patient Position: Sitting)  Pulse 79  Wt (!) 257 lb (116.6 kg)  BMI 42.77 kg/m2    PHYSICAL EXAMINATION:    --CONSTITUTIONAL: Well developed, well nourished, healthy appearing individual.  --PSYCHIATRIC: Appropriate mood and affect. No difficulty interacting due to temper, social withdrawal, or memory issues.  --SKIN: Lumbar region is dry and intact. Sensation to light touch is intact in the bilateral L4, L5, and S1 dermatomes.  --RESPIRATORY: Normal rhythm and effort. No abnormal accessory muscle breathing patterns noted.   --MUSCULOSKELETAL:  Normal lumbar lordosis noted, no lateral shift.  --GROSS MOTOR: Easily arises from a seated position.   --LUMBAR SPINE:  Inspection reveals no evidence of deformity. Range of motion is not limited in lumbar flexion, extension, or lateral rotation. Mild tenderness to palpation lumbar spine parapsinals. Straight leg raising in the supine position is negative to radicular pain. Sciatic notch non-tender.   --LOWER EXTREMITY MOTOR  TESTING:  Plantar flexion left 5/5, right 5/5   Dorsiflexion left 5/5, right 5/5   Great toe MTP extension left 5/5, right 5/5  Knee flexion left 5/5, right 5/5  Knee extension left 5/5, right 5/5   Hip flexion left 5/5, right 5/5  Hip abduction left 5/5, right 5/5  Hip adduction left 5/5, right 5/5   --HIPS: Full range of motion bilaterally. Negative FABERs on the involved lower extremity.   --KNEE: Negative anterior and posterior drawer bilateral knees, no pain with palpation bilateral knees, no effusions noted.  --NEUROLOGIC: Bilateral patellar and achilles reflexes are physiologic and symmetric. Lower extremities are intact to light touch.     RESULTS:   Imaging:     Left knee x-ray   5/30/2017  Findings:  There is no fracture, lytic or blastic process, significant degenerative change, dislocation, subluxation or malalignment.  There is no significant soft tissue abnormality.  There is no radiopaque foreign body.

## 2021-06-13 NOTE — PROGRESS NOTES
HPI:   Marissa Dixon is a 42 y.o. female referred to see me by Ashley Martin CNP for a lipoma on her back.  The entirety of my history was obtained from the patient via an .  This mass has been present for at least a year.  She first noticed it when she looked in the mirror.  It is located between her scapula and has been getting bigger over time.  She denies it ever becoming infected or having any drainage.  It has been imaged via ultrasound and MRI.  The patient presents today to understand more information regarding her surgical options and need.    Allergies:  Review of patient's allergies indicates no known allergies.    Past Medical History:   Diagnosis Date     Anemia      Disease of thyroid gland      Painful swelling of joint    Hypothyroidism  Chronic back pain    Past Surgical History:   Procedure Laterality Date      SECTION, CLASSIC         CURRENT MEDS:    Current Outpatient Prescriptions:      gabapentin (NEURONTIN) 300 MG capsule, Take 3 capsules (900 mg total) by mouth 3 (three) times a day. Follow Gabapentin Dosing chart given, Disp: 270 capsule, Rfl: 3     levothyroxine (SYNTHROID) 50 MCG tablet, Take 1 tablet (50 mcg total) by mouth daily., Disp: 90 tablet, Rfl: 3    Family history:  Family History   Problem Relation Age of Onset     Cancer Neg Hx      Diabetes Neg Hx      Heart disease Neg Hx      Hyperlipidemia Neg Hx      Hypertension Neg Hx      Stroke Neg Hx        Social history:   reports that she has never smoked. She has never used smokeless tobacco.    Review of Systems:  General: No complaints or constitutional symptoms  Skin: Nonpainful mass in upper back  Hematologic/Lymphatic: No symptoms or complaints  Psychiatric: No symptoms or complaints  Endocrine: No excessive fatigue, no hypermetabolic symptoms reported  Respiratory: No cough, shortness of breath, or wheezing  Cardiovascular: No chest pain or dyspnea on exertion  Gastrointestinal: No abdominal pain,  "nausea, diarrhea, or constipation  Musculoskeletal: No recent injuries reported  Neurological: No focal neurologic defects reported  Breast: No discharge, skin changes, or palpable masses    EXAM:  /65 (Patient Site: Right Arm, Patient Position: Sitting, Cuff Size: Thigh)  Pulse 68  Ht 5' 5\" (1.651 m)  Wt (!) 265 lb 1.6 oz (120.2 kg)  LMP 10/30/2017  SpO2 100%  Breastfeeding? No  BMI 44.11 kg/m2  Body mass index is 44.11 kg/(m^2).  General : Alert, cooperative, appears stated age   Skin: Skin color, texture, turgor normal, approximate 8 x 8 cm mobile soft tissue mass located on the upper back between the scapula.  Lymphatic: No obvious adenopathy, no swelling   Eyes: No scleral icterus, pupils equal  HENT: no traumatic injury to the head or face, no gross abnormalities  Lungs: Normal respiratory effort, breath sounds equal bilaterally  Heart: Regular rate and rhythm  Abdomen: Obese, soft, nontender  Musculoskeletal: No obvious swelling  Neurologic: Grossly intact    IMAGES:   Relevant images were reviewed and discussed with the patient.  Notable findings were:     [facility]  MR NECK SOFT TISSUE ONLY W WO CONTRAST  10/20/2017 11:33 AM     INDICATION: Soft tissue mass right upper back between spine and scapula.  TECHNIQUE: Without IV contrast.  IV CONTRAST: Gadavist 10  COMPARISON: Ultrasound chest 10/6/2017      FINDINGS: There is a well-circumscribed area of fat signal intensity within the right posterior subcutaneous fat of the visualized neck/upper back measuring 6.2 x 6.4 x 3.6 cm in the craniocaudal, transverse and AP dimensions respectively. There is no   internal enhancement.     The remainder of the neck structures are normal in appearance, including the parotid glands, submandibular glands, posterior nasopharynx, larynx, floor of mouth and trachea. The craniocervical junction is normal in appearance.     IMPRESSION:   CONCLUSION:  1. A 6.2 x 6.4 x 3.6 cm well-circumscribed fatty mass " in the right posterior lower neck/upper back with imaging characteristics most consistent with a lipoma. This mass underlies the superficial skin markers and presumably represents the palpable   clinical abnormality.     Assessment/Plan:   1. Lipoma of back        Marissa Dixon is a 42 y.o. female with signs and symptoms consistent with a lipoma on her back.  I have explained the pathophysiology of lipomas in detail as well as the surgical versus non-operative management strategies.      The risks of surgery were discussed in detail which include, but are not limited to, bleeding, infection, and injury to surrounding structures.  Additionally, the risks of non operative management were discussed which include, but are not limited to, continued growth of the lipoma.    She understands everything which was discussed and would like to consider these 2 options.  A packet was presented to her and she has our contact information to schedule surgery at her earliest convenience.      Olivia Bowles, DO   [facility]  [phone]

## 2021-06-13 NOTE — PROGRESS NOTES
"  Assessment/Plan:       1. Mass on back  Soft tissue swelling/mass present on the upper right side of her back between her shoulder blades.  I believe this most likely a benign solitary cyst/soft tissue cyst however cannot fully evaluate this without further imaging.  An ultrasound was ordered to have this evaluated further.  Based on the results further evaluation and referrals will be made.  Patient would likely need general surgery to have it removed if this is what she wishes.  - US Chest; Future        Subjective:      Marissa Dixon is a 42 y.o. female who presents for concerns on bump on back that has been present for about 6 months. There is no pain associated with this. It is between her shoulder blades on the right side of her upper back. She feels like this is swollen and getting bigger. This isn't affecting her daily life or activities.  She denies any pain associated with this.  She would like it assessed to make sure it is not anything concerning.  She denies any previous lesions similar to this or other associated symptoms. She denies any other questions or concerns today.     The following portions of the patient's history were reviewed and updated as appropriate: allergies, current medications and problem list.    Review of Systems   Pertinent items are noted in HPI.      Objective:      /72 (Patient Site: Left Arm, Patient Position: Sitting, Cuff Size: Adult Large)  Pulse 64  Resp 16  Ht 5' 5\" (1.651 m)  Wt (!) 263 lb (119.3 kg)  BMI 43.77 kg/m2    General appearance: alert, appears stated age and cooperative  Back: symmetric, no curvature. ROM normal. No CVA tenderness.  Soft tissue, mobile mass 10cm x 11cm on right side of upper back. Slight pain is present with deep palpation.   Lungs: clear to auscultation bilaterally  Heart: regular rate and rhythm, S1, S2 normal, no murmur, click, rub or gallop  Skin: Skin color, texture, turgor normal. No rashes or lesions  Neurologic: Grossly " normal

## 2021-06-13 NOTE — PROGRESS NOTES
[facility] Discharge Summary  Patient Name: Marissa Dixon  Date: 9/14/2017  Referral Diagnosis: Left lumbar radiculitis  Referring provider: Deborah Mae C*  Visit Diagnosis:   1. Chronic bilateral low back pain without sciatica     2. Chronic left shoulder pain     3. Muscle weakness (generalized)         Goals:  Pt. will be independent with home exercise program in : 6 weeks;Not Met  Pt will: be able to sleep through the night without waking d/t pain; in 6 weeks; Not Met  Pt will: be able to reach up into a cupboard without pain; in 6 weeks; Not Met  Pt will: be able to perform housework without pain; in 6 weeks; Not Met    Patient was seen for 2 visits from 7/6/17 to 7/25/17 with 2 missed appointments.  The patient attended therapy initially, but did not finish the therapy sessions prescribed.  Goals were not fully achieved. Explanation for goals not achieved: Did not return to PT.  Patient received a home program core strengthening, scapular/shoulder strengthening.  The patient discontinued therapy, did not return.  No further therapy is required at this time.    Therapy will be discontinued at this time.  The patient will need a new referral to resume.    Thank you for your referral.  Selene Donnelly, PT, DPT  9/14/2017  11:47 AM

## 2021-06-18 NOTE — ANESTHESIA CARE TRANSFER NOTE
Last vitals:   Vitals:    05/29/18 1353   BP: 110/63   Pulse: 100   Resp: 16   Temp: 36.5  C (97.7  F)   SpO2: 100%     Patient's level of consciousness is drowsy  Spontaneous respirations: yes  Maintains airway independently: yes  Dentition unchanged: yes  Oropharynx: oropharynx clear of all foreign objects    QCDR Measures:  ASA# 20 - Surgical Safety Checklist: WHO surgical safety checklist completed prior to induction  PQRS# 430 - Adult PONV Prevention: 4558F - Pt received => 2 anti-emetic agents (different classes) preop & intraop  ASA# 8 - Peds PONV Prevention: NA - Not pediatric patient, not GA or 2 or more risk factors NOT present  PQRS# 424 - Alexus-op Temp Management: 4559F - At least one body temp DOCUMENTED => 35.5C or 95.9F within required timeframe  PQRS# 426 - PACU Transfer Protocol: - Transfer of care checklist used  ASA# 14 - Acute Post-op Pain: ASA14B - Patient did NOT experience pain >= 7 out of 10

## 2021-06-18 NOTE — PROGRESS NOTES
This is a 43-year-old female who I saw last November for a posterior thoracic lipoma.  At that time, the patient did not schedule surgery.  History is obtained with the assistance of an .  The patient states that she feels like the area is getting bigger.  She denies any infections or pain in this area.  She is ready to schedule lipoma excision.  She denies any new medical history.    Exam:  Unchanged from November    Assessment:  Posterior thoracic lipoma (seen on MRI)    Plan:  The risks and benefits of surgery were discussed with the patient.  All questions were answered regarding the surgical process.  She would require a preoperative physical by her primary care physician.  She would require a ride for the day.  She is not to eat anything 6 hours before surgery.  I anticipate removal of the lesion in the lateral position.  We will schedule surgery at her earliest convenience.    Olivia Bowles, DO  [facility]  [phone]

## 2021-06-18 NOTE — ANESTHESIA POSTPROCEDURE EVALUATION
Patient: Marissa Dixon  POSTERIOR THORAX MASS EXCISION  Anesthesia type: MAC    Patient location: Phase II Recovery  Last vitals:   Vitals:    05/29/18 1430   BP: 119/68   Pulse: 76   Resp: 16   Temp:    SpO2: 97%     Post vital signs: stable  Level of consciousness: awake and responds to simple questions  Post-anesthesia pain: pain controlled  Post-anesthesia nausea and vomiting: no  Pulmonary: unassisted, return to baseline  Cardiovascular: stable and blood pressure at baseline  Hydration: adequate  Anesthetic events: no    QCDR Measures:  ASA# 11 - Alexus-op Cardiac Arrest: ASA11B - Patient did NOT experience unanticipated cardiac arrest  ASA# 12 - Alexus-op Mortality Rate: ASA12B - Patient did NOT die  ASA# 13 - PACU Re-Intubation Rate: ASA13B - Patient did NOT require a new airway mgmt  ASA# 10 - Composite Anes Safety: ASA10A - No serious adverse event    Additional Notes:

## 2021-07-03 NOTE — ADDENDUM NOTE
Addendum Note by Deborah Mae CNP at 7/24/2017  1:41 PM     Author: Deborah Mae CNP Service: -- Author Type: Nurse Practitioner    Filed: 7/24/2017  1:41 PM Date of Service: 7/24/2017  1:41 PM Status: Signed    : Deborah Mae CNP (Nurse Practitioner)    Encounter addended by: Deborah Mae CNP on: 7/24/2017  1:41 PM<BR>     Actions taken: LOS modified, Sign clinical note

## 2021-07-03 NOTE — ADDENDUM NOTE
Addendum Note by Ashley Martin CNP at 4/17/2017 10:49 AM     Author: Ashley Martin CNP Service: -- Author Type: Nurse Practitioner    Filed: 4/17/2017 10:49 AM Encounter Date: 4/13/2017 Status: Signed    : Ashley Martin CNP (Nurse Practitioner)    Addended by: ASHLEY MARTIN on: 4/17/2017 10:49 AM        Modules accepted: Orders

## 2021-07-03 NOTE — ANESTHESIA PREPROCEDURE EVALUATION
Anesthesia Preprocedure Evaluation by Jeffry Stacy MD at 5/29/2018 11:22 AM     Author: Jeffry Stacy MD Service: -- Author Type: Physician    Filed: 5/29/2018 11:24 AM Date of Service: 5/29/2018 11:22 AM Status: Signed    : Jeffry Stacy MD (Physician)       Anesthesia Evaluation      No history of anesthetic complications     Airway   Mallampati: III  Neck ROM: full   Pulmonary - negative ROS and normal exam    breath sounds clear to auscultation                         Cardiovascular - negative ROS and normal exam   Neuro/Psych - negative ROS     Endo/Other    (+) hypothyroidism, obesity (bmi 43),      GI/Hepatic/Renal - negative ROS      Other findings: anemia      Dental                             Anesthesia Plan  Planned anesthetic: MAC  Versed/fent/prop - ketamine PRN    Decadron/zofran    FiO2 less than 30%  ASA 3   Induction: intravenous   Anesthetic plan and risks discussed with: patient and  services used  Anesthesia plan special considerations: antiemetics,   Post-op plan: routine recovery        Results for orders placed or performed during the hospital encounter of 05/29/18   POCT Pregnancy (Beta-hCG, Qual), Urine (Point of Care) on DOS   Result Value Ref Range    POC Preg, Urine Negative Negative    POCt Kit Lot Number [id number]     POCT Kit Expiration Date 10/19      Lab Results   Component Value Date    WBC 4.2 04/13/2017    HGB 11.9 (L) 04/13/2017    HCT 36.3 04/13/2017    MCV 82 04/13/2017     04/13/2017

## 2021-08-21 ENCOUNTER — HEALTH MAINTENANCE LETTER (OUTPATIENT)
Age: 46
End: 2021-08-21

## 2021-10-16 ENCOUNTER — HEALTH MAINTENANCE LETTER (OUTPATIENT)
Age: 46
End: 2021-10-16

## 2022-09-25 ENCOUNTER — HEALTH MAINTENANCE LETTER (OUTPATIENT)
Age: 47
End: 2022-09-25

## 2023-12-23 ENCOUNTER — HEALTH MAINTENANCE LETTER (OUTPATIENT)
Age: 48
End: 2023-12-23